# Patient Record
(demographics unavailable — no encounter records)

---

## 2025-01-30 NOTE — IMAGING
[de-identified] : right middle finger TTP a1 pulley +triggering otherwise farom neurovascular intact distally   left hand no swelling or deformity no thenar atrophy middle finger TTP a1 pulley +triggering full active range of motion decreased sensation to the median nerve intact ulnar and radial sensation ain/pin/ulnar motor intact equivocal tinels, phalens and durkans, negative spurling sign palpable pulses with CR<2s full motion to the elbow, shoulder  xrays bilateral hands 3 views each taken today- no fractures or acute changes

## 2025-01-30 NOTE — HISTORY OF PRESENT ILLNESS
[6] : 6 [9] : 9 [Dull/Aching] : dull/aching [Intermittent] : intermittent [Leisure] : leisure [Nothing helps with pain getting better] : Nothing helps with pain getting better [de-identified] :  01/30/2025 :JANIEN BELLE , a 62 year old male, presents today for angeli hand pain.  started about a year ago, R MF clicking and painful. L MF clicking also. is s/p R CTR- also with left MF Numbness and wakes up in pain in the middle of the night in the left hand. tried night brace but ripped it off in the middle if the night RHD, retired  HTN, hypercholesterolemia, asthma NKDA   [] : no

## 2025-01-30 NOTE — ASSESSMENT
[FreeTextEntry1] :  EMG L UE- to eval CTS versus cervical radiculopathy  We reviewed the anatomy of the flexor sheath and pathology of trigger fingers with the use of drawings and discussion.  We discussed the treatment options including splinting/nsaids, injection and surgery.  We discussed that too many injections may lead to weakening of the tendon/tendon rupture and the safety of two injections. After a discussion of the risks, benefits and alternatives along with the expectations, the patient was amenable to observation  The patient was advised of the diagnosis.  The natural history of the pathology was explained in full to the patient in layman's terms. We discussed the nature of the nerve as an electrical cable and what happens to the nerve in carpal tunnel syndrome.  We discussed that treatment for night symptoms included night bracing.  We discussed the possibility of injection when symptoms were intermittent or in patients who were unwilling to undergo surgery with constant symptoms.  We discussed that injection is a diagnostic and therapeutic aide and what this means.  We discussed the use of nerve testing in cases when diagnosis was in doubt or for confirmation to exclude alternate pathology.  We discussed that if symptoms were 24/7 surgery was recommended to give the nerve the best chance to recover but that once symptoms were constant, the nerve may not recover even with surgery.  We discussed that if left alone the nerve progression could worsen and that treatment was indicated to prevent progression of nerve compression.  The longer the nerve is left, the more likely to cause worsening irreversible damage.  All questions were answered.  The risks and benefits of surgical and non-surgical treatment alternatives were explained in full to the patient.

## 2025-02-05 NOTE — HISTORY OF PRESENT ILLNESS
[6] : 6 [9] : 9 [Dull/Aching] : dull/aching [Intermittent] : intermittent [Leisure] : leisure [Nothing helps with pain getting better] : Nothing helps with pain getting better [de-identified] : 2/5/2025: pt here s/p EMG performed by Dr. Bravo 2/3/2025 Images reviewed by me today. My independent interpretation of this test is severe left carpal tunnel syndrome as well as left sided cervical radiculitis to the C6-7-8 distribution as well as mild left cubital tunnel syndrome. Pt denies associated weakness.  01/30/2025 :JANINE BELLE , a 62 year old male, presents today for angeli hand pain.  started about a year ago, R MF clicking and painful. L MF clicking also. is s/p R CTR- also with left MF Numbness and wakes up in pain in the middle of the night in the left hand. tried night brace but ripped it off in the middle if the night   RHD, retired  HTN, hypercholesterolemia, asthma, Hx of previous cervical injury ("nahid had multiple surgeries on my neck") NKDA     [] : no

## 2025-02-05 NOTE — IMAGING
[de-identified] : right middle finger TTP a1 pulley +triggering otherwise farom neurovascular intact distally    mildly +Spurling to the left upper extremity. (left chronic triceps atrophy)   left hand no swelling or deformity no thenar atrophy middle finger TTP a1 pulley +triggering full active range of motion decreased sensation to the median nerve intact ulnar and radial sensation ain/pin/ulnar motor intact equivocal tinels, phalens and durkans, negative spurling sign palpable pulses with CR<2s full motion to the elbow, shoulder +tinel over the left cubital tunnel  1/30/2025- xrays bilateral hands 3 views each taken today- no fractures or acute changes

## 2025-02-05 NOTE — ASSESSMENT
[FreeTextEntry1] : The patient was advised of the diagnosis. The natural history of the pathology was explained in full to the patient in layman's terms. All questions were answered. The risks and benefits of surgical and non-surgical treatment alternatives were explained in full to the patient.  We discussed the recommendation for left cubital and left carpal tunnel surgery- We reviewed that the goal of surgery is to prevent worsening and give the nerve a chance to recover. If symptoms are constant there is a chance that the nerve is already too badly damaged and no longer has the potential to recover. Risks, benefits, and alternatives of surgery discussed with patient (and family). Risks including but not limited to infection, blood loss, tendon damage, muscle damage, nerve damage, stiffness, pain, no resolution of symptoms, CRPS, loss of function, potential for secondary surgery, loss of limb or life. Patient understands and was allowed to voice questions or concerns. They agree to surgery.  We again reviewed the anatomy of the flexor sheath and pathology of trigger fingers with the use of drawings and discussion.  The patient has failed injections/nonoperative treatment.  We discussed the possibility of surgery including the use of an open trigger finger release.  We discussed that too many injections may lead to weakening of the tendon/tendon rupture and that surgery is indicated at this point.  Risks include bleeding, infection, injury to nerves, vessels, tendons, stiffness, pain, loss of range of motion, loss of function, CRPS, and risks and complications of anesthesia.  We discussed the surgical plan and post op expectations.  We also discussed the possibility of prolonged pain/scar tissue and the possible need for therapy.  The patient is amenable to the risks, had the opportunity to ask questions, all questions were answered and the patient signed consent of their own accord.  They will be contacted by my surgical scheduler.  Pt will be scheduled for left cubital and left carpal tunnel release / left middle trigger finger release.

## 2025-03-10 NOTE — ASSESSMENT
[FreeTextEntry1] : The patient was advised of the diagnosis. The natural history of the pathology was explained in full to the patient in layman's terms. All questions were answered. The risks and benefits of surgical and non-surgical treatment alternatives were explained in full to the patient.  Pt provided left middle trigger finger CSI #1 and left Carpal Tunnel CSI #1 today.  The risks, benefits and contents of the injection have been discussed.  Risks include but are not limited to allergic reaction, flare reaction, permanent white skin discoloration at the injection site and infection.  The patient understands the risks and agrees to having the injection.  We also discussed that about 22% of patients have relief at a year but the rest have recurrence if the symptoms.  However, if the injection is successful it is a positive predictor of benefit of surgery.  Success of the injection to relieve symptoms is also a great diagnostic test and obviates the need for EMG testing.  The patient verbalized understanding.  All questions have been answered. A sterile prep of the left volar wrist was performed and the carpal tunnel was entered and injected with 1 cc of Lidocaine and 6mg of celestone. The patient tolerated the procedure well without complication. The patient was instructed to ice the area of the injection.  We reviewed the anatomy of the flexor sheath and pathology of trigger fingers with the use of drawings and discussion.  We discussed the treatment options including splinting/nsaids, injection and surgery.  We discussed that too many injections may lead to weakening of the tendon/tendon rupture and the safety of two injections. After a discussion of the risks, benefits and alternatives along with the expectations, the patient was amenable to injection.  The indication for injection is pain and inflammation.  The skin overlying the tendon sheath/A1 pulley site was prepared with alcohol and ethyl chloride was sprayed topically.  Sterile technique was used. An injection of 1ml of lidocaine and 6mg of betamethasone was used.  The patient was instructed to call if redness, pain or fever occur.  They may apply ice for 15 minutes every hour for the next 12-24 hours as tolerated.  The patient understands that it may take 2-5 days to see a noticeable difference.  Sterile Band-Aid was applied.  Scheduled for left Cubital / left Carpal tunnel release and left middle finger trigger release.

## 2025-03-10 NOTE — IMAGING
[de-identified] : right middle finger TTP a1 pulley -triggering otherwise farom neurovascular intact distally    mildly +Spurling to the left upper extremity. (left chronic triceps atrophy)   left hand no swelling or deformity no thenar atrophy middle finger TTP a1 pulley +triggering full active range of motion decreased sensation to the median nerve intact ulnar and radial sensation ain/pin/ulnar motor intact + tinels, phalens and durkans, negative spurling sign palpable pulses with CR<2s full motion to the elbow, shoulder +tinel over the left cubital tunnel  1/30/2025- xrays bilateral hands 3 views each taken today- no fractures or acute changes

## 2025-03-10 NOTE — HISTORY OF PRESENT ILLNESS
[Intermittent] : intermittent [Leisure] : leisure [Nothing helps with pain getting better] : Nothing helps with pain getting better [9] : 9 [5] : 5 [Dull/Aching] : dull/aching [Stabbing] : stabbing [Throbbing] : throbbing [de-identified] : 03/10/2025: Patient is here today for follow up of his LT hand. States that he woke up 2 days ago with pain in his hand again.  Pt scheduled for left Cubital and left Carpal Tunnel release and left middle trigger finger release.   Pt requesting left carpal tunnel and left middle trigger finger CSI #1.  2/5/2025: pt here s/p EMG performed by Dr. Bravo 2/3/2025 Images reviewed by me today. My independent interpretation of this test is severe left carpal tunnel syndrome as well as left sided cervical radiculitis to the C6-7-8 distribution and mild left cubital tunnel syndrome. Pt denies associated weakness.  01/30/2025 :JANINE BELLE , a 62 year old male, presents today for angeli hand pain.  started about a year ago, R MF clicking and painful. L MF clicking also. is s/p R CTR- also with left MF Numbness and wakes up in pain in the middle of the night in the left hand. tried night brace but ripped it off in the middle if the night   RHD, retired  HTN, hypercholesterolemia, asthma, Hx of previous cervical injury ("nahid had multiple surgeries on my neck") NKDA     [] : no

## 2025-05-12 NOTE — IMAGING
[de-identified] : Left wrist, elbow wound clean dry and intact no erythema no drainage FAROM NV unchanged sutures removed

## 2025-05-12 NOTE — ASSESSMENT
[FreeTextEntry1] : The patient was advised of the diagnosis. The natural history of the pathology was explained in full to the patient in layman's terms. All questions were answered. The risks and benefits of surgical and non-surgical treatment alternatives were explained in full to the patient.  Wound care discussed.  Patient will perform HEP for ROM  RTO 3 weeks

## 2025-05-12 NOTE — HISTORY OF PRESENT ILLNESS
[9] : 9 [5] : 5 [Dull/Aching] : dull/aching [Stabbing] : stabbing [Throbbing] : throbbing [Intermittent] : intermittent [Leisure] : leisure [Nothing helps with pain getting better] : Nothing helps with pain getting better [de-identified] : DOS: LEFT CARPAL  AND CUBITAL TUNNEL RELEASE   LEFT MF TFR  05/12/2025: Here for PO #1 s/p left CTR, CUTR, MF TFR. Doing well since surgery- NVI unchanged   03/10/2025: Patient is here today for follow up of his LT hand. States that he woke up 2 days ago with pain in his hand again.  Pt scheduled for left Cubital and left Carpal Tunnel release and left middle trigger finger release.   Pt requesting left carpal tunnel and left middle trigger finger CSI #1.  2/5/2025: pt here s/p EMG performed by Dr. Bravo 2/3/2025 Images reviewed by me today. My independent interpretation of this test is severe left carpal tunnel syndrome as well as left sided cervical radiculitis to the C6-7-8 distribution and mild left cubital tunnel syndrome. Pt denies associated weakness.  01/30/2025 :JANINE BELLE , a 62 year old male, presents today for angeli hand pain.  started about a year ago, R MF clicking and painful. L MF clicking also. is s/p R CTR- also with left MF Numbness and wakes up in pain in the middle of the night in the left hand. tried night brace but ripped it off in the middle if the night   RHD, retired  HTN, hypercholesterolemia, asthma, Hx of previous cervical injury ("nahid had multiple surgeries on my neck") NKDA     [] : no

## 2025-06-04 NOTE — ASSESSMENT
[FreeTextEntry1] : The patient was advised of the diagnosis. The natural history of the pathology was explained in full to the patient in layman's terms. All questions were answered. The risks and benefits of surgical and non-surgical treatment alternatives were explained in full to the patient.  Wound care discussed.  Patient will continue perform HEP for ROM  RTO PRN

## 2025-06-04 NOTE — HISTORY OF PRESENT ILLNESS
[9] : 9 [5] : 5 [Dull/Aching] : dull/aching [Stabbing] : stabbing [Throbbing] : throbbing [Intermittent] : intermittent [Leisure] : leisure [Nothing helps with pain getting better] : Nothing helps with pain getting better [de-identified] : DOS: LEFT CARPAL  AND CUBITAL TUNNEL RELEASE   LEFT MF TFR  06/04/2025: Here for PO #1 s/p left CTR, CUTR, MF TFR. Reports feeling tightness in the hand. Otherwise doing well.   05/12/2025: Here for PO #1 s/p left CTR, CUTR, MF TFR. Doing well since surgery- NVI unchanged   03/10/2025: Patient is here today for follow up of his LT hand. States that he woke up 2 days ago with pain in his hand again.  Pt scheduled for left Cubital and left Carpal Tunnel release and left middle trigger finger release.   Pt requesting left carpal tunnel and left middle trigger finger CSI #1.  2/5/2025: pt here s/p EMG performed by Dr. Bravo 2/3/2025 Images reviewed by me today. My independent interpretation of this test is severe left carpal tunnel syndrome as well as left sided cervical radiculitis to the C6-7-8 distribution and mild left cubital tunnel syndrome. Pt denies associated weakness.  01/30/2025 :JANINE BELLE , a 62 year old male, presents today for angeli hand pain.  started about a year ago, R MF clicking and painful. L MF clicking also. is s/p R CTR- also with left MF Numbness and wakes up in pain in the middle of the night in the left hand. tried night brace but ripped it off in the middle if the night   RHD, retired  HTN, hypercholesterolemia, asthma, Hx of previous cervical injury ("nahid had multiple surgeries on my neck") NKDA     [] : no

## 2025-06-04 NOTE — REASON FOR VISIT
02/08/24                            Benjamin Das  [address] [facility] [place] [address]    To Whom It May Concern:    This is to certify Benjamin Das was evaluated with Zora Landaverde MD on 02/08/24 and can return when fever free for 24 hours.     RESTRICTIONS: none            Electronically signed by:  Zora Landaverde MD  [address]   [facility] [address]  Dept Phone: [phone]        [FreeTextEntry2] : PO #1  angeli hand pain

## 2025-06-04 NOTE — IMAGING
[de-identified] : Left wrist, elbow wound clean dry and intact no erythema no drainage FAROM NV unchanged